# Patient Record
Sex: FEMALE | Race: WHITE | NOT HISPANIC OR LATINO | URBAN - METROPOLITAN AREA
[De-identification: names, ages, dates, MRNs, and addresses within clinical notes are randomized per-mention and may not be internally consistent; named-entity substitution may affect disease eponyms.]

---

## 2021-08-02 ENCOUNTER — OFFICE VISIT (OUTPATIENT)
Dept: OBSTETRICS AND GYNECOLOGY | Facility: CLINIC | Age: 36
End: 2021-08-02

## 2021-08-02 VITALS
DIASTOLIC BLOOD PRESSURE: 60 MMHG | BODY MASS INDEX: 24.11 KG/M2 | HEIGHT: 62 IN | WEIGHT: 131 LBS | SYSTOLIC BLOOD PRESSURE: 102 MMHG

## 2021-08-02 DIAGNOSIS — Z01.419 WOMEN'S ANNUAL ROUTINE GYNECOLOGICAL EXAMINATION: Primary | ICD-10-CM

## 2021-08-02 PROCEDURE — 99395 PREV VISIT EST AGE 18-39: CPT | Performed by: NURSE PRACTITIONER

## 2021-08-02 RX ORDER — NORGESTIMATE AND ETHINYL ESTRADIOL 7DAYSX3 LO
1 KIT ORAL DAILY
COMMUNITY
End: 2021-08-02 | Stop reason: SDUPTHER

## 2021-08-02 RX ORDER — NORGESTIMATE AND ETHINYL ESTRADIOL 7DAYSX3 LO
1 KIT ORAL DAILY
Qty: 84 TABLET | Refills: 3 | Status: SHIPPED | OUTPATIENT
Start: 2021-08-02 | End: 2022-06-16

## 2021-08-02 NOTE — PROGRESS NOTES
GYN Annual Exam     CC - Here for annual exam.        HPI  Estephania Holden is a 36 y.o. female, , who presents for annual well woman exam. Patient's last menstrual period was 2021..  Periods are regular every 25-35 days, lasting 5 days. .  Dysmenorrhea:none.  Patient reports problems with: none.  There were no changes to her medical or surgical history since her last visit.. Partner Status: Marital Status: .  She is sexually active. She has not had new partners since her last STD testing. She does not desire STD testing. .    Additional OB/GYN History   Current contraception: contraceptive methods: OCP (estrogen/progesterone)  Desires to: continue contraception  Last Pap : 07/10/2020- negative  Last Completed Pap Smear          Ordered - PAP SMEAR (Every 3 Years) Ordered on 2021    07/10/2020  Done - negative              History of abnormal Pap smear: no  Family history of uterine, colon, breast, or ovarian cancer: no  Performs monthly Self-Breast Exam: no  Exercises Regularly:yes  Feelings of Anxiety or Depression: no  Tobacco Usage?: No   OB History        2    Para   2    Term   2            AB   0    Living   2       SAB        TAB        Ectopic        Molar        Multiple        Live Births                    Health Maintenance   Topic Date Due   • Annual Gynecologic Pelvic and Breast Exam  Never done   • ANNUAL PHYSICAL  Never done   • TDAP/TD VACCINES (1 - Tdap) Never done   • HEPATITIS C SCREENING  Never done   • INFLUENZA VACCINE  10/01/2021   • PAP SMEAR  07/10/2023   • COVID-19 Vaccine  Completed   • Pneumococcal Vaccine 0-64  Aged Out       The additional following portions of the patient's history were reviewed and updated as appropriate: allergies, current medications, past family history, past medical history, past social history, past surgical history and problem list.    Review of Systems   Constitutional: Negative.    Cardiovascular: Negative.   "  Gastrointestinal: Negative.    Genitourinary: Negative.    Skin: Negative.    Psychiatric/Behavioral: Negative.          I have reviewed and agree with the HPI, ROS, and historical information as entered above. DIXIE Dobson    Objective   /60   Ht 157.5 cm (62\")   Wt 59.4 kg (131 lb)   LMP 07/06/2021   BMI 23.96 kg/m²     Physical Exam  Exam conducted with a chaperone present.   Constitutional:       Appearance: Normal appearance. She is normal weight.   Cardiovascular:      Rate and Rhythm: Normal rate and regular rhythm.   Chest:      Breasts:         Right: Normal.         Left: Normal.   Abdominal:      Palpations: Abdomen is soft.   Genitourinary:     General: Normal vulva.      Vagina: Normal.      Cervix: Normal.      Uterus: Normal.       Adnexa: Right adnexa normal and left adnexa normal.      Rectum: Normal.   Neurological:      Mental Status: She is alert.            Assessment and Plan    Problem List Items Addressed This Visit     None      Visit Diagnoses     Women's annual routine gynecological examination    -  Primary    Relevant Orders    Pap IG, HPV-hr          1. GYN annual well woman exam.   2. Reviewed risks/benefits of hormonal contraception after age 35, including possible increased risk of breast cancer, heart disease, blood clots and strokes.  Patient strongly desires to stay on hormonal contraception.  3. Reviewed monthly self breast exams.  Instructed to call with lumps, pain, or breast discharge.    4. Reviewed exercise as a preventative health measures.   5. Reccommended Flu Vaccine in Fall of each year.  6. RTC in 1 year or PRN with problems      DIXIE Dobson  08/02/2021  "

## 2022-06-16 DIAGNOSIS — Z01.419 WOMEN'S ANNUAL ROUTINE GYNECOLOGICAL EXAMINATION: ICD-10-CM

## 2022-06-16 RX ORDER — NORGESTIMATE AND ETHINYL ESTRADIOL
KIT
Qty: 84 TABLET | Refills: 0 | Status: SHIPPED | OUTPATIENT
Start: 2022-06-16 | End: 2022-09-08

## 2022-06-16 NOTE — TELEPHONE ENCOUNTER
Patient last seen for annual 8/2/2021.  No upcoming appt scheduled.  Refill sent for three months.

## 2022-09-08 DIAGNOSIS — Z01.419 WOMEN'S ANNUAL ROUTINE GYNECOLOGICAL EXAMINATION: ICD-10-CM

## 2022-09-08 RX ORDER — NORGESTIMATE AND ETHINYL ESTRADIOL
KIT
Qty: 28 TABLET | Refills: 0 | Status: SHIPPED | OUTPATIENT
Start: 2022-09-08 | End: 2022-09-16 | Stop reason: SDUPTHER

## 2022-09-16 ENCOUNTER — OFFICE VISIT (OUTPATIENT)
Dept: OBSTETRICS AND GYNECOLOGY | Facility: CLINIC | Age: 37
End: 2022-09-16

## 2022-09-16 VITALS
WEIGHT: 132 LBS | DIASTOLIC BLOOD PRESSURE: 68 MMHG | HEIGHT: 62 IN | SYSTOLIC BLOOD PRESSURE: 92 MMHG | BODY MASS INDEX: 24.29 KG/M2

## 2022-09-16 DIAGNOSIS — Z30.41 ENCOUNTER FOR SURVEILLANCE OF CONTRACEPTIVE PILLS: ICD-10-CM

## 2022-09-16 DIAGNOSIS — Z01.419 WOMEN'S ANNUAL ROUTINE GYNECOLOGICAL EXAMINATION: Primary | ICD-10-CM

## 2022-09-16 DIAGNOSIS — N89.8 VAGINAL ODOR: ICD-10-CM

## 2022-09-16 PROCEDURE — 99395 PREV VISIT EST AGE 18-39: CPT | Performed by: NURSE PRACTITIONER

## 2022-09-16 RX ORDER — FLUTICASONE PROPIONATE 50 MCG
SPRAY, SUSPENSION (ML) NASAL
COMMUNITY
Start: 2022-07-11

## 2022-09-16 RX ORDER — AZELASTINE HYDROCHLORIDE 137 UG/1
SPRAY, METERED NASAL
COMMUNITY
Start: 2022-07-11

## 2022-09-16 RX ORDER — NORGESTIMATE AND ETHINYL ESTRADIOL 7DAYSX3 LO
1 KIT ORAL DAILY
Qty: 84 TABLET | Refills: 4 | Status: SHIPPED | OUTPATIENT
Start: 2022-09-16

## 2022-09-16 NOTE — PROGRESS NOTES
Gynecologic Annual Exam Note        Gynecologic Exam (Annual )        Subjective     HPI  Estephania Holden is a 37 y.o.  female who presents for annual well woman exam as a established patient. There were no changes to her medical or surgical history since her last visit.. Patient reports problems with: none. Patient's last menstrual period was 2022.. Her periods are regular every 25-35 days, lasting 3-4 days. The flow is light. Dysmenorrhea:none. . Partner Status: Marital Status: .  She is sexually active. She has not had new partners.. STD testing recommendations have been explained to the patient and she does not desire STD testing.    She reports occasional vaginal odor after intercourse.  She denies itching, burning, abn dc.  It resolves spontaneously.    Additional OB/GYN History   Current contraception: contraceptive methods: OCP (estrogen/progesterone)  Desires to: continue contraception  Thromboembolic Disease: none  Age of menarche: 11    History of STD: no    Last Pap : 2021. Results: negative. HPV: negative  Last Completed Pap Smear     This patient has no relevant Health Maintenance data.           History of abnormal Pap smear: no  Gardasil status:refuses  Family history of uterine, colon, breast, or ovarian cancer: no  Performs monthly Self-Breast Exam: no  Exercises Regularly:yes  Feelings of Anxiety or Depression: no  Tobacco Usage?: No       Current Outpatient Medications:   •  Azelastine HCl 137 MCG/SPRAY solution, , Disp: , Rfl:   •  fluticasone (FLONASE) 50 MCG/ACT nasal spray, , Disp: , Rfl:   •  norgestimate-ethinyl estradiol (Tri-Lo-Karyn) 0.18/0.215/0.25 MG-25 MCG per tablet, Take 1 tablet by mouth Daily., Disp: 84 tablet, Rfl: 4     Patient is requesting refills of OCP.    OB History        2    Para   2    Term   2            AB   0    Living   2       SAB        IAB        Ectopic        Molar        Multiple        Live Births               "      Health Maintenance   Topic Date Due   • ANNUAL PHYSICAL  Never done   • TDAP/TD VACCINES (1 - Tdap) Never done   • COVID-19 Vaccine (3 - Booster for Moderna series) 2021   • HEPATITIS C SCREENING  Never done   • Annual Gynecologic Pelvic and Breast Exam  2022   • INFLUENZA VACCINE  10/01/2022   • Pneumococcal Vaccine 0-64  Aged Out       Past Medical History:   Diagnosis Date   • Basal cell carcinoma         Past Surgical History:   Procedure Laterality Date   •  SECTION     • CHOLECYSTECTOMY     • WISDOM TOOTH EXTRACTION         The additional following portions of the patient's history were reviewed and updated as appropriate: allergies, current medications, past family history, past medical history, past social history, past surgical history and problem list.    Review of Systems   Constitutional: Negative.    HENT: Negative.    Eyes: Negative.    Respiratory: Negative.    Cardiovascular: Negative.    Gastrointestinal: Negative.    Endocrine: Negative.    Genitourinary: Negative.    Musculoskeletal: Negative.    Skin: Negative.    Allergic/Immunologic: Negative.    Neurological: Negative.    Hematological: Negative.    Psychiatric/Behavioral: Negative.          I have reviewed and agree with the HPI, ROS, and historical information as entered above. Rosi Tellez, APRN        Objective   BP 92/68   Ht 157.5 cm (62\")   Wt 59.9 kg (132 lb)   LMP 2022   BMI 24.14 kg/m²     Physical Exam  Vitals and nursing note reviewed. Exam conducted with a chaperone present.   Constitutional:       General: She is not in acute distress.     Appearance: Normal appearance. She is well-developed and normal weight. She is not ill-appearing.   HENT:      Head: Normocephalic and atraumatic.   Neck:      Thyroid: No thyroid mass or thyromegaly.   Pulmonary:      Effort: Pulmonary effort is normal. No retractions.   Chest:      Chest wall: No mass.   Breasts:      Right: Normal. No mass, nipple " discharge, skin change or tenderness.      Left: Normal. No mass, nipple discharge, skin change or tenderness.       Abdominal:      Palpations: Abdomen is soft. Abdomen is not rigid. There is no mass.      Tenderness: There is no abdominal tenderness. There is no guarding.      Hernia: No hernia is present. There is no hernia in the left inguinal area.   Genitourinary:     General: Normal vulva.      Labia:         Right: No rash, tenderness or lesion.         Left: No rash, tenderness or lesion.       Vagina: Normal. No vaginal discharge or lesions.      Cervix: Normal.      Uterus: Normal. Not enlarged, not fixed and not tender.       Adnexa: Right adnexa normal and left adnexa normal.        Right: No mass or tenderness.          Left: No mass or tenderness.        Rectum: No external hemorrhoid.   Musculoskeletal:      Cervical back: Normal range of motion. No muscular tenderness.   Skin:     General: Skin is warm and dry.   Neurological:      Mental Status: She is alert and oriented to person, place, and time.   Psychiatric:         Mood and Affect: Mood normal.         Behavior: Behavior normal.            Assessment and Plan    Problem List Items Addressed This Visit    None     Visit Diagnoses     Women's annual routine gynecological examination    -  Primary    Relevant Medications    norgestimate-ethinyl estradiol (Tri-Lo-Karyn) 0.18/0.215/0.25 MG-25 MCG per tablet    Vaginal odor        Encounter for surveillance of contraceptive pills              1. GYN annual well woman exam.   2. Reviewed pap guidelines.   3. Reviewed monthly self breast exams.  Instructed to call with lumps, pain, or breast discharge.    4. Reviewed exercise as a preventative health measures.   5. Reccommended Flu Vaccine in Fall of each year.  Return in about 1 year (around 9/16/2023) for Annual physical.   7.   Options of boric acid supp and Luvena reviewed.  RTO symptoms which do not resolve on their own.      Rosi Tellez,  APRN  09/16/2022

## 2022-09-19 LAB — REF LAB TEST METHOD: NORMAL

## 2023-11-10 DIAGNOSIS — Z01.419 WOMEN'S ANNUAL ROUTINE GYNECOLOGICAL EXAMINATION: ICD-10-CM

## 2023-11-10 RX ORDER — NORGESTIMATE AND ETHINYL ESTRADIOL
1 KIT DAILY
Qty: 84 TABLET | Refills: 3 | OUTPATIENT
Start: 2023-11-10

## 2024-01-17 ENCOUNTER — TELEPHONE (OUTPATIENT)
Dept: OBSTETRICS AND GYNECOLOGY | Facility: CLINIC | Age: 39
End: 2024-01-17
Payer: COMMERCIAL

## 2024-01-17 DIAGNOSIS — Z01.419 WOMEN'S ANNUAL ROUTINE GYNECOLOGICAL EXAMINATION: ICD-10-CM

## 2024-01-17 RX ORDER — NORGESTIMATE AND ETHINYL ESTRADIOL 7DAYSX3 LO
1 KIT ORAL DAILY
Qty: 84 TABLET | Refills: 0 | Status: SHIPPED | OUTPATIENT
Start: 2024-01-17

## 2024-01-17 NOTE — TELEPHONE ENCOUNTER
Patient is wanting birth control refill sent to [facility] in [facility] next annual scheduled for 2/23 @ 1230 last annual 9/2022

## 2024-02-23 ENCOUNTER — OFFICE VISIT (OUTPATIENT)
Dept: OBSTETRICS AND GYNECOLOGY | Facility: CLINIC | Age: 39
End: 2024-02-23
Payer: COMMERCIAL

## 2024-02-23 VITALS — BODY MASS INDEX: 23.41 KG/M2 | SYSTOLIC BLOOD PRESSURE: 108 MMHG | DIASTOLIC BLOOD PRESSURE: 62 MMHG | WEIGHT: 128 LBS

## 2024-02-23 DIAGNOSIS — N89.8 VAGINAL ODOR: ICD-10-CM

## 2024-02-23 DIAGNOSIS — Z01.419 WOMEN'S ANNUAL ROUTINE GYNECOLOGICAL EXAMINATION: Primary | ICD-10-CM

## 2024-02-23 DIAGNOSIS — Z01.419 PAP TEST, AS PART OF ROUTINE GYNECOLOGICAL EXAMINATION: ICD-10-CM

## 2024-02-23 DIAGNOSIS — Z12.31 BREAST CANCER SCREENING BY MAMMOGRAM: ICD-10-CM

## 2024-02-23 NOTE — PROGRESS NOTES
Gynecologic Annual Exam Note          GYN Annual Exam     Gynecologic Exam        Subjective     HPI  Estephania Holden is a 38 y.o. female, , who presents for annual well woman exam as a established patient. There were no changes to her medical or surgical history since her last visit..  Patient's last menstrual period was 2024 (exact date).  Her periods occur every 28 days, lasting 6 days.  The flow is light. She denies dysmenorrhea. Marital Status: . She is sexually active. She has not had new partners.. STD testing recommendations have been explained to the patient and she declines STD testing.    The patient would like to discuss the following complaints today: Patient reports she sometimes notices a vaginal odor sometimes after IC. Patient reports it has been happening on and off for about a year. She states it does not happen all the time, but sometimes she is self-conscious about it. Patient denies itching, irritation, discharge, or dyspareunia.     Additional OB/GYN History   contraceptive methods: OCP (estrogen/progesterone)  Desires to: continue contraception  History of migraines: no    Last Pap : 2022. Result: negative. HPV: not done. Her last HPV testing was 21 - negative.  Last Completed Pap Smear            Ordered - PAP SMEAR (Every 3 Years) Ordered on 2022  LIQUID-BASED PAP SMEAR, [facility] ([facility])    2021  SCANNED - PAP SMEAR    07/10/2020  Done - negative                  History of abnormal Pap smear: no  Family history of uterine, colon, breast, or ovarian cancer: no  Performs monthly Self-Breast Exam: no  Last mammogram: never.  She requests at [facility] in [place]    Colonoscopy: has had a colonoscopy 8-9 years ago  Exercises Regularly: yes  Feelings of Anxiety or Depression: no  Tobacco Usage?: No       Current Outpatient Medications:     Azelastine HCl 137 MCG/SPRAY solution, , Disp: , Rfl:     fluticasone (FLONASE) 50 MCG/ACT  nasal spray, , Disp: , Rfl:     norgestimate-ethinyl estradiol (Tri-Lo-Karyn) 0.18/0.215/0.25 MG-25 MCG per tablet, Take 1 tablet by mouth Daily., Disp: 84 tablet, Rfl: 0     Patient is requesting refills of birth control for the year.    OB History          2    Para   2    Term   2            AB   0    Living   2         SAB        IAB        Ectopic        Molar        Multiple        Live Births                    Past Medical History:   Diagnosis Date    Basal cell carcinoma         Past Surgical History:   Procedure Laterality Date     SECTION      CHOLECYSTECTOMY      WISDOM TOOTH EXTRACTION         Health Maintenance   Topic Date Due    MAMMOGRAM  Never done    TDAP/TD VACCINES (1 - Tdap) Never done    HEPATITIS C SCREENING  Never done    ANNUAL PHYSICAL  Never done    INFLUENZA VACCINE  Never done    COVID-19 Vaccine (3 - - season) 2023    Annual Gynecologic Pelvic and Breast Exam  2023    PAP SMEAR  2025    Pneumococcal Vaccine 0-64  Aged Out       The additional following portions of the patient's history were reviewed and updated as appropriate: allergies, current medications, past family history, past medical history, past social history, past surgical history, and problem list.    Review of Systems   Genitourinary:         Vaginal odor at times   All other systems reviewed and are negative.      I have reviewed and agree with the HPI, ROS, and historical information as entered above. Rosi Tellez, APRN        Objective   /62   Wt 58.1 kg (128 lb)   LMP 2024 (Exact Date)   BMI 23.41 kg/m²     Physical Exam  Vitals and nursing note reviewed. Exam conducted with a chaperone present.   Constitutional:       General: She is not in acute distress.     Appearance: Normal appearance. She is well-developed and normal weight. She is not ill-appearing.   Neck:      Thyroid: No thyroid mass or thyromegaly.   Pulmonary:      Effort: Pulmonary effort is  normal. No respiratory distress or retractions.   Chest:      Chest wall: No mass.   Breasts:     Right: Normal. No mass, nipple discharge, skin change or tenderness.      Left: Normal. No mass, nipple discharge, skin change or tenderness.   Abdominal:      General: There is no distension.      Palpations: Abdomen is soft. Abdomen is not rigid. There is no mass.      Tenderness: There is no abdominal tenderness. There is no guarding or rebound.      Hernia: No hernia is present. There is no hernia in the left inguinal area.   Genitourinary:     General: Normal vulva.      Labia:         Right: No rash, tenderness or lesion.         Left: No rash, tenderness or lesion.       Vagina: Normal. No vaginal discharge or lesions.      Cervix: Normal.      Uterus: Normal. Not enlarged, not fixed and not tender.       Adnexa: Right adnexa normal and left adnexa normal.        Right: No mass or tenderness.          Left: No mass or tenderness.        Rectum: No external hemorrhoid.   Musculoskeletal:      Cervical back: No muscular tenderness.   Skin:     General: Skin is warm and dry.   Neurological:      Mental Status: She is alert and oriented to person, place, and time.   Psychiatric:         Mood and Affect: Mood normal.         Behavior: Behavior normal.            Assessment and Plan    Problem List Items Addressed This Visit    None  Visit Diagnoses       Women's annual routine gynecological examination    -  Primary    Relevant Orders    LIQUID-BASED PAP SMEAR WITH HPV GENOTYPING IF ASCUS ([facility])    Vaginal odor        Breast cancer screening by mammogram        Relevant Orders    Mammo Screening Digital Tomosynthesis Bilateral With CAD    Pap test, as part of routine gynecological examination                GYN annual well woman exam.   Pap guidelines reviewed.  Reviewed monthly self breast exams.  Instructed to call with lumps, pain, or breast discharge.    Ordered Mammogram today  Reviewed exercise as a  preventative health measures.   Reccommended Flu Vaccine in Fall of each year.  Symptoms of menopausal transition reviewed with patient.   RTC in 1 year or PRN with problems.  Return in about 1 year (around 2/23/2025) for Annual physical.   Symptoms sound like pH changes; enc probiotic    Rosi Tellez, APRN  02/23/2024

## 2024-03-01 LAB — REF LAB TEST METHOD: NORMAL

## 2024-04-01 DIAGNOSIS — Z01.419 WOMEN'S ANNUAL ROUTINE GYNECOLOGICAL EXAMINATION: ICD-10-CM

## 2024-04-01 RX ORDER — NORGESTIMATE AND ETHINYL ESTRADIOL 7DAYSX3 LO
1 KIT ORAL DAILY
Qty: 84 TABLET | Refills: 3 | Status: SHIPPED | OUTPATIENT
Start: 2024-04-01

## 2024-06-05 DIAGNOSIS — Z01.419 WOMEN'S ANNUAL ROUTINE GYNECOLOGICAL EXAMINATION: ICD-10-CM

## 2024-06-05 RX ORDER — NORGESTIMATE AND ETHINYL ESTRADIOL 7DAYSX3 LO
1 KIT ORAL DAILY
Qty: 84 TABLET | Refills: 3 | Status: SHIPPED | OUTPATIENT
Start: 2024-06-05

## 2024-06-05 NOTE — TELEPHONE ENCOUNTER
Last annual 02/23/24    Received fax from [facility] for a Rx for Tri-Lo-Joi. Last Rx sent on 04/01/24 to [facility] with a quantity of 84 and 3 refills. Rx sent to [facility] pharmacy.

## 2025-05-07 DIAGNOSIS — Z01.419 WOMEN'S ANNUAL ROUTINE GYNECOLOGICAL EXAMINATION: ICD-10-CM

## 2025-05-07 RX ORDER — NORGESTIMATE AND ETHINYL ESTRADIOL
1 KIT DAILY
Qty: 84 TABLET | Refills: 0 | Status: SHIPPED | OUTPATIENT
Start: 2025-05-07

## 2025-07-30 DIAGNOSIS — Z01.419 WOMEN'S ANNUAL ROUTINE GYNECOLOGICAL EXAMINATION: ICD-10-CM

## 2025-07-30 RX ORDER — NORGESTIMATE AND ETHINYL ESTRADIOL
KIT
Qty: 84 TABLET | Refills: 3 | OUTPATIENT
Start: 2025-07-30

## 2025-08-29 ENCOUNTER — TELEPHONE (OUTPATIENT)
Dept: OBSTETRICS AND GYNECOLOGY | Facility: CLINIC | Age: 40
End: 2025-08-29
Payer: COMMERCIAL

## 2025-08-29 DIAGNOSIS — Z01.419 WOMEN'S ANNUAL ROUTINE GYNECOLOGICAL EXAMINATION: ICD-10-CM

## 2025-08-29 DIAGNOSIS — Z78.9 USES BIRTH CONTROL: Primary | ICD-10-CM

## 2025-08-29 RX ORDER — NORGESTIMATE AND ETHINYL ESTRADIOL 7DAYSX3 LO
1 KIT ORAL DAILY
Qty: 28 TABLET | Refills: 0 | Status: SHIPPED | OUTPATIENT
Start: 2025-08-29